# Patient Record
Sex: FEMALE | Employment: UNEMPLOYED | URBAN - METROPOLITAN AREA
[De-identification: names, ages, dates, MRNs, and addresses within clinical notes are randomized per-mention and may not be internally consistent; named-entity substitution may affect disease eponyms.]

---

## 2017-05-24 ENCOUNTER — OFFICE VISIT - HEALTHEAST (OUTPATIENT)
Dept: INTERNAL MEDICINE | Facility: CLINIC | Age: 22
End: 2017-05-24

## 2017-05-24 DIAGNOSIS — Z12.4 SCREENING FOR CERVICAL CANCER: ICD-10-CM

## 2017-05-24 DIAGNOSIS — Z00.00 ROUTINE GENERAL MEDICAL EXAMINATION AT A HEALTH CARE FACILITY: ICD-10-CM

## 2017-05-24 DIAGNOSIS — J45.20 MILD INTERMITTENT ASTHMA, UNCOMPLICATED: ICD-10-CM

## 2017-05-24 ASSESSMENT — MIFFLIN-ST. JEOR: SCORE: 1643.32

## 2018-05-31 ENCOUNTER — COMMUNICATION - HEALTHEAST (OUTPATIENT)
Dept: INTERNAL MEDICINE | Facility: CLINIC | Age: 23
End: 2018-05-31

## 2019-05-19 ENCOUNTER — COMMUNICATION - HEALTHEAST (OUTPATIENT)
Dept: INTERNAL MEDICINE | Facility: CLINIC | Age: 24
End: 2019-05-19

## 2020-05-06 ENCOUNTER — VIRTUAL VISIT (OUTPATIENT)
Dept: FAMILY MEDICINE | Facility: CLINIC | Age: 25
End: 2020-05-06
Payer: COMMERCIAL

## 2020-05-06 DIAGNOSIS — L65.9 HAIR LOSS: ICD-10-CM

## 2020-05-06 DIAGNOSIS — Z78.9 HX OF FOREIGN TRAVEL: Primary | ICD-10-CM

## 2020-05-06 SDOH — HEALTH STABILITY: MENTAL HEALTH: HOW OFTEN DO YOU HAVE 6 OR MORE DRINKS ON ONE OCCASION?: LESS THAN MONTHLY

## 2020-05-06 SDOH — HEALTH STABILITY: MENTAL HEALTH: HOW MANY STANDARD DRINKS CONTAINING ALCOHOL DO YOU HAVE ON A TYPICAL DAY?: 1 OR 2

## 2020-05-06 SDOH — HEALTH STABILITY: MENTAL HEALTH: HOW OFTEN DO YOU HAVE A DRINK CONTAINING ALCOHOL?: 2-3 TIMES A WEEK

## 2020-05-06 NOTE — NURSING NOTE
Chief Complaint   Patient presents with     Physical     routine check up - routine visit after return for [facility],     Medication Reconciliation     completed.        There were no vitals taken for this visit.      ~ Jonas Stern CMA ([place])  [facility]  Phone: [phone]

## 2020-05-06 NOTE — PROGRESS NOTES
"Family Medicine Video Visit Note  Abena is being evaluated via a billable video visit.               Video Visit Consent     Patient was verbally read the following and verbal consent was obtained.  \"Video visits are billed at different rates depending on your insurance coverage. During this emergency period, for some insurers they may be billed the same as an in-person visit.  Please reach out to your insurance provider with any questions.  If during the course of the call the physician/provider feels a telephone visit is not appropriate, you will not be charged for this service.\"     (Name person giving consent:  Patient   Date verbal consent given:  5/6/2020  Time verbal consent given:  8:08 AM)    Patient would like the video invitation sent by: Send to e-mail at: [email or web address]       Chief Complaint   Patient presents with     Physical     routine check up - routine visit after return for [employer],     Medication Reconciliation     completed.        There were no vitals taken for this visit.      ~ Jonas Stern CMA (Chrissi)  [facility]  Phone: [phone]                      HPI       Video Start Time: 8:24 AM    Abena presents to clinic today for the following health issues:    New Patient/Transfer of Care  Needs lab for [employer] and concern about hair loss    -------------------------------------  Current Outpatient Medications   Medication Sig Dispense Refill     levonorgestrel (MIRENA) 20 MCG/24HR IUD 1 each (20 mcg) by Intrauterine route once for 1 dose 1 each 0     No Known Allergies     1. Labs for [employer]  Was in [facility] January-March 2020 and suddenly evacuated due to COVID. In order to end her [employer] service she needs to have the following labs: stool ova and parasite x3, HIV screen, CBC with diff, UA, B creen  No dysuria, diarrhea, constipation, chest pain, sob, abdominal pain, or  symptoms.     2. Hair loss  3 months of hair loss while in " [facility]. Seemed to stop for a few weeks, but when returned home in March 2020 hair loss started again. Coming out in clumps and feels like she has bald spots. Hair breaking at her scalp. No new shampoos or hair products. No hair extensions and does not straighten hair. Hair loss with hair up or not.   No personal or family history of hair loss.   Finger nails breaking easier, skin seems normal--breaking out a little bit more than usual.   No ocontstipation   No temp intolerances  Has mirena, no other medications right now. In [facility] did take medications for malaria and schisto--she is unsure of the names  Stress with RVX and then back due to COVID.   Was eating meat prior to leaving for [facility]. Ate meat in [facility] but since returning to the  does not like the taste. Is eating eggs and beans for protein right now.  No lice that she knows of       PMH:  Mirena, no medical issues, no surgeries  Family history: parents and brother are all living and healthy    No tobacco, rare alcohol use, no drug use.                  Review of Systems:     ROS COMP: CONSTITUTIONAL: NEGATIVE for fever, chills, change in weight  INTEGUMENTARY/SKIN: NEGATIVE for worrisome rashes, moles or lesions  EYES: NEGATIVE for vision changes or irritation  ENT/MOUTH: NEGATIVE for ear, mouth and throat problems  RESP: NEGATIVE for significant cough or SOB  CV: NEGATIVE for chest pain, palpitations or peripheral edema  GI: NEGATIVE for nausea, abdominal pain, heartburn, or change in bowel habits  : NEGATIVE for frequency, dysuria, or hematuria  MUSCULOSKELETAL: NEGATIVE for significant arthralgias or myalgia  ENDOCRINE: POSITIVE  for hair loss and brittle breaking finger nails  PSYCHIATRIC: NEGATIVE for changes in mood or affect         Physical Exam:     There were no vitals taken for this visit.  There is no height or weight on file to calculate BMI.    GENERAL: healthy, alert and no distress  EYES: Eyes grossly normal to inspection,  conjunctivae and sclerae normal  RESP: no audible wheeze, cough, or visible cyanosis.  No visible retractions or increased work of breathing.  Able to speak fully in complete sentences.  NEURO: Cranial nerves grossly intact, mentation intact and speech normal  PSYCH: mentation appears normal, affect normal/bright, judgement and insight intact, normal speech and appearance well-groomed  Hair: evidence of one bald spot seen on camera at the top of her head.      Results from last visit:  No results found for any previous visit.             Assessment and Plan   (Z78.9) Hx of foreign travel  (primary encounter diagnosis)  Comment: lived in [facility] for 3 months for [employer], abruptly evacuated due to COVID19. Requires the follow labs for [facility]: stool ova and parasite x2, TB test, HIV screen, CBC with diff, UA  Plan:   - patient will come to clinic for the above labs    (L65.9) Hair loss  Comment: difficult exam via video but was able to see one bald spot. Hair loss at the scalp started in January 2020 when patient moved to [facility] for [employer]. Has had recent stress of moving to [facility] and then abruptly leaving due to covid which could contribute to hair loss. No longer eating meat due to taste at this time so also could contribute. Brittle nails and hair loss--would need to check thyroid as well. Also took medications for malaria and schisto ppx, but patient unsure of medication names--will need more info on meds to assess if hair loss is side effect.   Plan:   - appt in 2 days at the clinic for physical exam, thyroid lab, and BMP      Refilled medications that would be required in the next 3 months.     After Visit Information:  Will print and mail AVS       F/u 5/8/2020      Video-Visit Details    Type of service:  Video Visit    Video End Time (time video stopped): 8:40 AM    Originating Location (pt. Location): Home    Distant Location (provider location):  [facility]     Mode of Communication:   Video Conference via [facility]      Barak Campos MD  I precepted today with Dr. Castillo

## 2020-05-06 NOTE — PROGRESS NOTES
Preceptor Attestation:  Patient's case reviewed and discussed with Barak Campos MD resident. I agree with written assessment and plan of care.  Supervising Physician:  Prince Castillo MD, MD NEFF  [facility]

## 2020-05-08 ENCOUNTER — OFFICE VISIT (OUTPATIENT)
Dept: FAMILY MEDICINE | Facility: CLINIC | Age: 25
End: 2020-05-08
Payer: COMMERCIAL

## 2020-05-08 VITALS
TEMPERATURE: 98.2 F | SYSTOLIC BLOOD PRESSURE: 119 MMHG | OXYGEN SATURATION: 100 % | WEIGHT: 162.8 LBS | HEART RATE: 62 BPM | DIASTOLIC BLOOD PRESSURE: 78 MMHG

## 2020-05-08 DIAGNOSIS — Z78.9 RECENT FOREIGN TRAVEL: ICD-10-CM

## 2020-05-08 DIAGNOSIS — L65.9 HAIR LOSS: Primary | ICD-10-CM

## 2020-05-08 LAB
% GRANULOCYTES: 40.6 %G (ref 40–75)
BACTERIA: NORMAL
BILIRUBIN UR: NEGATIVE MG/DL
BLOOD UR: NEGATIVE MG/DL
BUN SERPL-MCNC: 8 MG/DL (ref 5–24)
CALCIUM SERPL-MCNC: 9.5 MG/DL (ref 8.5–10.4)
CASTS: NORMAL /LPF
CHLORIDE SERPLBLD-SCNC: 103 MMOL/L (ref 94–109)
CLARITY, URINE: CLEAR
CO2 SERPL-SCNC: 27 MMOL/L (ref 20–32)
COLOR UR: YELLOW
CREAT SERPL-MCNC: 0.8 MG/DL (ref 0.6–1.3)
CRYSTAL URINE: NORMAL /LPF
EGFR CALCULATED (BLACK REFERENCE): >90 ML/MIN
EGFR CALCULATED (NON BLACK REFERENCE): >90 ML/MIN
EPITHELIAL CELLS UR: NORMAL /LPF (ref 0–2)
GLUCOSE SERPL-MCNC: 105 MG/DL (ref 60–109)
GLUCOSE URINE: NEGATIVE
GRANULOCYTES #: 2.3 K/UL (ref 1.6–8.3)
HCT VFR BLD AUTO: 46.4 % (ref 35–47)
HEMOGLOBIN: 14.5 G/DL (ref 11.7–15.7)
HIV 1+2 AB+HIV1 P24 AG SERPL QL IA: NEGATIVE
KETONES UR QL: NEGATIVE MG/DL
LEUKOCYTE ESTERASE UR: ABNORMAL
LYMPHOCYTES # BLD AUTO: 3 K/UL (ref 0.8–5.3)
LYMPHOCYTES NFR BLD AUTO: 52.5 %L (ref 20–48)
MCH RBC QN AUTO: 27.5 PG (ref 26.5–35)
MCHC RBC AUTO-ENTMCNC: 31.3 G/DL (ref 32–36)
MCV RBC AUTO: 87.8 FL (ref 78–100)
MID #: 0.4 K/UL (ref 0–2.2)
MID %: 6.9 %M (ref 0–20)
MUCOUS URINE: NORMAL LPF
NITRITE UR QL STRIP: NEGATIVE MG/DL
PH UR STRIP: 6.5 [PH] (ref 4.5–8)
PLATELET # BLD AUTO: 337 K/UL (ref 150–450)
POTASSIUM SERPL-SCNC: 4.2 MMOL/L (ref 3.4–5.3)
PROTEIN UR: NEGATIVE MG/DL
RBC # BLD AUTO: 5.28 M/UL (ref 3.8–5.2)
RBC URINE: NORMAL /HPF
SODIUM SERPL-SCNC: 135 MMOL/L (ref 133–144)
SP GR UR STRIP: 1.01 (ref 1–1.03)
TSH SERPL DL<=0.05 MIU/L-ACNC: 1.4 UIU/ML (ref 0.3–5)
UROBILINOGEN UR STRIP-ACNC: ABNORMAL E.U./DL
WBC # BLD AUTO: 5.7 K/UL (ref 4–11)
WBC URINE: NORMAL /HPF

## 2020-05-08 NOTE — PROGRESS NOTES
"       HPI:       Abena Pereyra is a 25 year old  female who presents for:      1. Hair loss  - noticed hair loss starting in September-December 2019. Was in [facility] June 2019-March 2020 for [employer]  - Hunger season (less food available to buy and eat) sept/oct noticed hair falling out (dec-July harvest season)   - there was also a \"Chicken disease\" that  went through village so no eggs or protein during that time    -  [place] season: Dec-January she was able to get more food and hair was not falling out  - March 2020 [place] due [place] and lost hair (slowed down).  - no longer eating meat in the [place] (it tastes different to her and is expensive)  - now eating eggs and beans for protein   - no changes in shampoo, does not dara hair products  - does not straighten or curl hair or use any heat.   - hair loss if she wears hair up or down   - no family history of female hair loss  -  No constipation or diarrhea, no temp intolerances.   - does have more brittle finger nails than normal    - took malarone while in [facility] for malria ppx  - currently has mirena IUD and takes a multivitamin      2. Labs   - needs labs after returning [place] the [place] from [facility]--was there for the [employer] but had to leave early due to COVID  - stool ova and parasite x3, HIV screen, CBC with diff, UA, hep B screen, TB test  - required labs and documentation for \"end of service\"           PMHX:     There is no problem list on file for this patient.      Current Outpatient Medications   Medication Sig Dispense Refill     levonorgestrel (MIRENA) 20 MCG/24HR IUD 1 each (20 mcg) by Intrauterine route once for 1 dose 1 each 0       Social History: applying to grad school now    Family History: no family history of thyroid disease or female hair loss     No Known Allergies    No results found for this or any previous visit (from the past 24 hour(s)).         Review of Systems:   10 point ROS negative except noted in above in " HPI         Physical Exam:     Vitals:    05/08/20 0923   BP: 119/78   Pulse: 62   Temp: 98.2  F (36.8  C)   TempSrc: Oral   SpO2: 100%   Weight: 73.8 kg (162 lb 12.8 oz)     There is no height or weight on file to calculate BMI.    GENERAL APPEARANCE: healthy, alert and no distress,  NECK: no adenopathy, no asymmetry, masses, or scars and thyroid normal to palpation  RESP: lungs clear to auscultation - no rales, rhonchi or wheezes  CV: regular rate and rhythm,  and no murmur, click,  rub or gallop  SKIN: small brown macule on anterior left forearm with small pea sized moveable mass below   PSYCH: mood and affect normal/bright  Hair: thinning of hair at the crown of the head, scalp skin is clear no evidence of erythema or rash, no other areas of hair loss or breakage       Assessment and Plan     (L65.9) Hair loss  (primary encounter diagnosis)  Comment: small area of hair thinning on crown. No evidence of fungus or lice. ddx includes stress from recent travel vs changes in diet, especially going without meat vs thyroid disease vs anemia. Does not seem to be a side effect from malarone  Plan: CBC with Diff Plt ([facility]), Basic Metabolic         Panel ([facility]) - Results < 1 hr, Thyroid         [place] ([facility])  - discussed stress and hair loss  - given information on high protein, no meat, foods   - thyroid, CBC, and BMP today    (Z78.9) Recent foreign travel  Comment: needs the following labs for [place]:  Plan: CBC with Diff Plt ([facility]), TB Quantiferon Gold        Plus ([facility]), Ova And Parasite - Stool         ([facility]), HIV Ag/Ab Screen [place]         ([email or web address]), Hepatitis B Surface Ab         ([facility]), Hepatitis B Surface Ag         ([facility]), Urinalysis, Micro If ([facility])              Options for treatment and follow-up care were reviewed with the patient and/or guardian. Abena Bridgessten and/or guardian engaged in the decision making process and verbalized understanding of the  options discussed and agreed with the final plan.      Barak Campos MD   [facility] Resident   Pager: [phone]      Precepted today with: Amy Carmichael MD

## 2020-05-08 NOTE — LETTER
May 13, 2020      Abena Pereyra  [address] [place] [address]        Dear ,    We are writing to inform you of your test results.    Negative for TB   Negative for HIV and Hep B   Basic labs look normal   Urine does not look infected   Thyroid is normal   Hemoglobin normal   At this time, your labs do not point to a cause for your hair loss. It could be related to stress and diet. Please continue trying to get protein into your diet and we can follow up in a few months if your hair loss continues.     Resulted Orders   CBC with Diff Plt ([facility])   Result Value Ref Range    WBC 5.7 4.0 - 11.0 K/uL    Lymphocytes # 3.0 0.8 - 5.3 K/uL    % Lymphocytes 52.5 (H) 20.0 - 48.0 %L    Mid # 0.4 0.0 - 2.2 K/uL    Mid % 6.9 0.0 - 20.0 %M    GRANULOCYTES # 2.3 1.6 - 8.3 K/uL    % Granulocytes 40.6 40.0 - 75.0 %G    RBC 5.28 (H) 3.80 - 5.20 M/uL    Hemoglobin 14.5 11.7 - 15.7 g/dL    Hematocrit 46.4 35.0 - 47.0 %    MCV 87.8 78.0 - 100.0 fL    MCH 27.5 26.5 - 35.0 pg    MCHC 31.3 (L) 32.0 - 36.0 g/dL    Platelets 337.0 150.0 - 450.0 K/uL   Basic Metabolic Panel ([facility]) - Results < 1 hr   Result Value Ref Range    Glucose 105.0 60.0 - 109.0 mg/dL    Urea Nitrogen 8.0 5.0 - 24.0 mg/dL    Creatinine 0.8 0.6 - 1.3 mg/dL    Sodium 135.0 133.0 - 144.0 mmol/L    Potassium 4.2 3.4 - 5.3 mmol/L    Chloride 103.0 94.0 - 109.0 mmol/L    Carbon Dioxide 27.0 20.0 - 32.0 mmol/L    Calcium 9.5 8.5 - 10.4 mg/dL    eGFR Calculated (Non Black Reference) >90 >60.0 mL/min    eGFR Calculated (Black Reference) >90 >60.0 mL/min   TB Quantiferon Gold Plus ([facility])   Result Value Ref Range    QTF Result Negative Negative    QTF Interpretation       No interferon-gamma response to M. tuberculosis antigens was detected.  Infecton with M.   tuberculosis is unlikely.  A negative result alone does not exclude infection with M.   tuberculosis      QTF Nil 0.02 IU/mL    QTF Antigen TB1-NIL 0.01 IU/mL    QTF Antigen TB2-NIL 0.01 IU/mL     QTF Mitogen - Nil 7.51 IU/mL    Narrative    Test performed by:  [facility] [address] [place] [address]   Thyroid [place] ([facility])   Result Value Ref Range    TSH 1.40 0.30 - 5.00 uIU/mL    Narrative    Test performed by:  [facility] [address] [place] [address]   HIV Ag/Ab Screen [place] ([facility])   Result Value Ref Range    HIV Antigen/Antibody Negative Negative    Narrative    Test performed by:  [facility] [address] [place] [address]  Method is Abbott HIV Ag/Ab for the detection of HIV p24 antigen, HIV-1   antibodies and HIV-2 antibodies.   Hepatitis B Surface Ab ([facility])   Result Value Ref Range    Hepatitis B Surface Antibody Positive (A) Negative    Narrative    Test performed by:  [facility] [address] [place] [address]   Hepatitis B Surface Ag ([facility])   Result Value Ref Range    Hepatitis B Surface Antigen Negative Negative    Narrative    Test performed by:  [facility] [address] [place] [address]   Urinalysis, Micro If ([facility])   Result Value Ref Range    Specific Gravity Urine 1.010 1.005 - 1.030    pH Urine 6.5 4.5 - 8.0    Leukocyte Esterase UR 1+ (A) NEGATIVE    Nitrite Urine Negative NEGATIVE mg/dL    Protein UR Negative NEGATIVE mg/dL    Glucose Urine Negative NEGATIVE    Ketones Urine Negative NEGATIVE mg/dL    Urobilinogen mg/dL 0.2 E.U./dL 0.2 E.U./dL E.U./dL    Bilirubin UR Negative NEGATIVE mg/dL    Blood UR Negative NEGATIVE mg/dL    Color Urine Yellow     Clarity, urine Clear    Urine Microscopic ([facility])   Result Value Ref Range    WBC Urine 2-5 <5 /hpf    RBC Urine None <5 /hpf    Epithelial Cells UR 10-25 0 - 2 /lpf    Mucous Urine None NONE lpf    Casts Urine None NONE /lpf    Crystal Urine None NONE /lpf    Bacteria Wet Prep Moderate None       If you have any questions or concerns, please call the clinic at the number listed above.       Sincerely,        Barak Campos,  MD

## 2020-05-08 NOTE — PATIENT INSTRUCTIONS
Hair loss could be due to stress or diet or abnormal labs. We will check labs today.   High protein foods that are not meat: eggs, cottage cheese, peanut butter, nuts, protein shakes and powders, beans, greek yogurt, milk.

## 2020-05-08 NOTE — PROGRESS NOTES
Preceptor Attestation:  Patient's case reviewed and discussed with Barak Campos MD resident and I evaluated the patient. I agree with written assessment and plan of care.  Supervising Physician:  MILES SALGADO MD  [facility]

## 2020-05-09 LAB — HBV SURFACE AG SERPL QL IA: NEGATIVE

## 2020-05-11 DIAGNOSIS — Z78.9 RECENT FOREIGN TRAVEL: ICD-10-CM

## 2020-05-11 LAB — HBV SURFACE AB SER-ACNC: POSITIVE M[IU]/ML

## 2020-05-12 DIAGNOSIS — Z78.9 RECENT FOREIGN TRAVEL: ICD-10-CM

## 2020-05-12 LAB
O+P SPEC MICRO: NORMAL
O+P SPEC MICRO: NORMAL
QTF ANTIGEN TB1-NIL: 0.01 IU/ML
QTF ANTIGEN TB2-NIL: 0.01 IU/ML
QTF INTERPRETATION: NORMAL
QTF MITOGEN - NIL: 7.51 IU/ML
QTF NIL: 0.02 IU/ML
QTF RESULT: NEGATIVE

## 2020-05-12 NOTE — LETTER
May 14, 2020    Stool sample negative for ova and parasite x3.      Abena Pereyra  [address] [address]        Dear ,    We are writing to inform you of your test results.    {results letter list:110532}    Resulted Orders   Ova And Parasite - Stool ([facility])   Result Value Ref Range    Ova and Parasite No ova or parasites seen No Ova or Parasites seen    Narrative    Test performed by:  [facility] [address] [place] [address]       If you have any questions or concerns, please call the clinic at the number listed above.       Sincerely,        [facility]

## 2020-05-12 NOTE — RESULT ENCOUNTER NOTE
Please send a letter for these results. The patient may continue her current plan of care.  Negative for TB  Negative for HIV and Hep B  Basic labs look normal  Urine does not look infected  Thyroid is normal  Hemoglobin normal   At this time, your labs do not point to a cause for your hair loss. It could be related to stress and diet. Please continue trying to get protein into your diet and we can follow up in a few months if your hair loss continues.         Barak Campos MD

## 2020-05-13 LAB — O+P SPEC MICRO: NORMAL

## 2020-05-14 NOTE — RESULT ENCOUNTER NOTE
Please call the patient with the results. Stool sample negative for ova and parasite x3.     Barak Campos MD

## 2021-05-28 NOTE — TELEPHONE ENCOUNTER
RN cannot approve Refill Request    RN can NOT refill this medication PCP messaged that patient is overdue for Office Visit and Physical        Lynette Neves, [facility] Triage/Med Refill 5/20/2019    Requested Prescriptions   Pending Prescriptions Disp Refills     APRI 0.15-0.03 mg per tablet [Pharmacy Med Name: APRI 28 DAY TABLET] 84 tablet 3     Sig: TAKE 1 TABLET BY MOUTH DAILY.       Oral Contraceptives Protocol Failed - 5/19/2019 12:31 AM        Failed - Visit with PCP or prescribing provider visit in last 12 months      Last office visit with prescriber/PCP: Visit date not found OR same dept: Visit date not found OR same specialty: Visit date not found  Last physical: 5/24/2017 Last MTM visit: Visit date not found   Next visit within 3 mo: Visit date not found  Next physical within 3 mo: Visit date not found  Prescriber OR PCP: Davidson Hercules MD  Last diagnosis associated with med order: 1. Contraception  - APRI 0.15-0.03 mg per tablet [Pharmacy Med Name: APRI 28 DAY TABLET]; TAKE 1 TABLET BY MOUTH DAILY.  Dispense: 84 tablet; Refill: 3    If protocol passes may refill for 12 months if within 3 months of last provider visit (or a total of 15 months).

## 2021-05-31 VITALS — WEIGHT: 181 LBS | HEIGHT: 70 IN | BODY MASS INDEX: 25.91 KG/M2

## 2021-06-10 NOTE — PROGRESS NOTES
Office Visit - New Patient   Abena Pereyra   22 y.o.  female    Date of visit: 5/24/2017  Physician: Davidson Hercules MD     Assessment and Plan   1. Routine general medical examination at a health care facility  This is a healthy 22-year-old woman.  I reviewed her previous labs, normal lipids, mild elevation in hemoglobin at 16.  She is a .  Continue healthy lifestyle.  She declines screening for sexually transmitted infections.  She has had one male partner in her life.  She is condoms.    2. Contraception  Also uses for menstrual cramps  - desogestrel-ethinyl estradiol (APRI) 0.15-0.03 mg per tablet; Take 1 tablet by mouth daily.  Dispense: 84 tablet; Refill: 3    3. Screening for cervical cancer  Not indicated at this time, screening recommended at age 21    4. Mild intermittent asthma, uncomplicated  Albuterol as needed    Return in about 1 year (around 5/24/2018) for annual physical.     Chief Complaint   Chief Complaint   Patient presents with     Annual Exam     fasting        Patient Profile   Social History     Social History Narrative    Lives with her mom, dad, and younger brother, Saulo. 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          Past Medical History   Patient Active Problem List   Diagnosis     Mild intermittent asthma, uncomplicated     Contraception       Past Surgical History  She has a past surgical history that includes [place] tooth extraction.     History of Present Illness   This 22 y.o. old woman comes in to establish care and for annual physical.  Her medical history was reviewed, electronic medical record is updated and it is reflected in this note.  She is quite healthy.  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  She swims competitively.  History of asthma but it has been quite quiescent.    Review of Systems: A comprehensive review of systems was negative except as noted.  "    Medications and Allergies   Current Outpatient Prescriptions   Medication Sig Dispense Refill     albuterol (PROAIR HFA) 90 mcg/actuation inhaler Inhale 2 puffs every 4 (four) hours as needed for wheezing. 2 Inhaler 6     desogestrel-ethinyl estradiol (APRI) 0.15-0.03 mg per tablet Take 1 tablet by mouth daily. 84 tablet 3     MULTIVITAMIN ORAL Take by mouth daily.       OMEGA-3/DHA/EPA/FISH OIL (FISH OIL-OMEGA-3 FATTY ACIDS) 300-1,000 mg capsule Take 2 g by mouth daily.       No current facility-administered medications for this visit.      Allergies   Allergen Reactions     Grass Pollen-Jimenez, Standard      Trees         Family and Social History   Family History   Problem Relation Age of Onset     Pancreatic cancer Maternal Grandmother      Lung cancer Maternal Grandmother      Liver cancer Maternal Grandmother      Hypertension Maternal Grandmother      Hypertension Mother      Basal cell carcinoma Mother      Esophageal cancer Paternal Grandfather      Other Brother      adopted     Atrial fibrillation Father         Social History   Substance Use Topics     Smoking status: Never Smoker     Smokeless tobacco: Never Used      Comment: no secondhand smoke exposure     Alcohol use 1.8 oz/week     3 Standard drinks or equivalent per week        Physical Exam   General Appearance:   No acute distress    /68 (Patient Site: Right Arm, Patient Position: Sitting, Cuff Size: Adult Regular)  Pulse 60  Ht 5' 9.5\" (1.765 m)  Wt 181 lb (82.1 kg)  SpO2 99%  BMI 26.35 kg/m2    EYES: Eyelids, conjunctiva, and sclera were normal. Pupils were normal. Cornea, iris, and lens were normal bilaterally.  HEAD, EARS, NOSE, MOUTH, AND THROAT: Head and face were normal. Hearing was normal to voice and the ears were normal to external exam. Nose appearance was normal and there was no discharge. Oropharynx was normal.  NECK: Neck appearance was normal. There were no neck masses and the thyroid was not " enlarged.  RESPIRATORY: Breathing pattern was normal and the chest moved symmetrically.  Percussion/auscultatory percussion was normal.  Lung sounds were normal and there were no abnormal sounds.  CARDIOVASCULAR: Heart rate and rhythm were normal.  S1 and S2 were normal and there were no extra sounds or murmurs. Peripheral pulses in arms and legs were normal.  Jugular venous pressure was normal.  There was no peripheral edema.  GASTROINTESTINAL: The abdomen was normal in contour.  Bowel sounds were present.  Percussion detected no organ enlargement or tenderness.  Palpation detected no tenderness, mass, or enlarged organs.   MUSCULOSKELETAL: Skeletal configuration was normal and muscle mass was normal for age. Joint appearance was overall normal.  LYMPHATIC: There were no enlarged nodes.  SKIN/HAIR/NAILS: Skin color was normal.  There were no skin lesions.  Hair and nails were normal.  NEUROLOGIC: The patient was alert and oriented to person, place, time, and circumstance. Speech was normal. Cranial nerves were normal. Motor strength was normal for age. The patient was normally coordinated.  PSYCHIATRIC:  Mood and affect were normal and the patient had normal recent and remote memory. The patient's judgment and insight were normal.       Additional Information        Davidson Hercules MD  Internal Medicine  Contact me at [phone]